# Patient Record
Sex: MALE | NOT HISPANIC OR LATINO | URBAN - METROPOLITAN AREA
[De-identification: names, ages, dates, MRNs, and addresses within clinical notes are randomized per-mention and may not be internally consistent; named-entity substitution may affect disease eponyms.]

---

## 2017-12-16 ENCOUNTER — EMERGENCY (EMERGENCY)
Facility: HOSPITAL | Age: 67
LOS: 1 days | Discharge: HOME | End: 2017-12-16
Attending: EMERGENCY MEDICINE | Admitting: EMERGENCY MEDICINE
Payer: MEDICARE

## 2017-12-16 VITALS
RESPIRATION RATE: 16 BRPM | OXYGEN SATURATION: 96 % | DIASTOLIC BLOOD PRESSURE: 70 MMHG | TEMPERATURE: 98 F | HEART RATE: 62 BPM | SYSTOLIC BLOOD PRESSURE: 116 MMHG

## 2017-12-16 VITALS
OXYGEN SATURATION: 99 % | RESPIRATION RATE: 20 BRPM | TEMPERATURE: 98 F | HEART RATE: 71 BPM | DIASTOLIC BLOOD PRESSURE: 81 MMHG | SYSTOLIC BLOOD PRESSURE: 152 MMHG

## 2017-12-16 PROCEDURE — 85730 THROMBOPLASTIN TIME PARTIAL: CPT

## 2017-12-16 PROCEDURE — 99284 EMERGENCY DEPT VISIT MOD MDM: CPT

## 2017-12-16 PROCEDURE — 84484 ASSAY OF TROPONIN QUANT: CPT

## 2017-12-16 PROCEDURE — 80048 BASIC METABOLIC PNL TOTAL CA: CPT

## 2017-12-16 PROCEDURE — 71046 X-RAY EXAM CHEST 2 VIEWS: CPT

## 2017-12-16 PROCEDURE — 85027 COMPLETE CBC AUTOMATED: CPT

## 2017-12-16 PROCEDURE — 99283 EMERGENCY DEPT VISIT LOW MDM: CPT | Mod: 25

## 2017-12-16 PROCEDURE — 85610 PROTHROMBIN TIME: CPT

## 2017-12-16 PROCEDURE — 71020: CPT | Mod: 26

## 2017-12-16 RX ORDER — METOPROLOL TARTRATE 50 MG
0 TABLET ORAL
Qty: 0 | Refills: 0 | COMMUNITY

## 2017-12-16 RX ORDER — TAMSULOSIN HYDROCHLORIDE 0.4 MG/1
1 CAPSULE ORAL
Qty: 0 | Refills: 0 | COMMUNITY

## 2017-12-16 RX ORDER — FINASTERIDE 5 MG/1
0 TABLET, FILM COATED ORAL
Qty: 0 | Refills: 0 | COMMUNITY

## 2017-12-16 RX ORDER — ASPIRIN/CALCIUM CARB/MAGNESIUM 324 MG
1 TABLET ORAL
Qty: 0 | Refills: 0 | COMMUNITY

## 2017-12-16 RX ORDER — ATORVASTATIN CALCIUM 80 MG/1
0 TABLET, FILM COATED ORAL
Qty: 0 | Refills: 0 | COMMUNITY

## 2017-12-16 RX ORDER — ATENOLOL 25 MG/1
25 TABLET ORAL ONCE
Qty: 0 | Refills: 0 | Status: COMPLETED | OUTPATIENT
Start: 2017-12-16 | End: 2017-12-16

## 2017-12-16 RX ORDER — OMEPRAZOLE 10 MG/1
0 CAPSULE, DELAYED RELEASE ORAL
Qty: 0 | Refills: 0 | COMMUNITY

## 2017-12-16 RX ADMIN — ATENOLOL 25 MILLIGRAM(S): 25 TABLET ORAL at 05:59

## 2017-12-16 NOTE — ED ADULT NURSE NOTE - OBJECTIVE STATEMENT
67 y.o male aaox3 ambulatory with h/o GERD, and "leaking heart valve" as per pt presented with c/o chest tightness non radiating and palpitations since yesterday. Denies any dizziness, lightheadedness, chills or fever, nausea, vomiting, or abd pain. VS wnl, EKG NSR. Had stress test last year from own cardiologist with results of "leaking heart valve", as per pt.

## 2017-12-16 NOTE — ED PROVIDER NOTE - OBJECTIVE STATEMENT
Attendinyo male presents with feeling of a skipped beat in the chest.  Has a choking sensation in the chest when he has the discomfort.  Had this happen about 10 years ago.  States he had an irregular heartbeat which resolved with taking atenolol.  Last night symptoms restarted again around 9pm or midnight.  States he feels uncomfortable when he gets the episodes.  States he feels it at rest.  No fever.  No cough.    PCP/Cardiology:  Dr. Mckeon

## 2017-12-16 NOTE — ED PROVIDER NOTE - PROGRESS NOTE DETAILS
Desmond:  Pt had symptoms while I was assessing him.  On monitor, he was having PACs. Desmond:  Discussed with Dr. Mckeon.  Symptoms are likely due to PACs.  Will give atenolol 25mg once.  Will have pt follow up with Dr. Mckeon as outpatient.

## 2020-09-09 ENCOUNTER — TRANSCRIPTION ENCOUNTER (OUTPATIENT)
Age: 70
End: 2020-09-09

## 2022-01-26 PROBLEM — Z00.00 ENCOUNTER FOR PREVENTIVE HEALTH EXAMINATION: Status: ACTIVE | Noted: 2022-01-26

## 2022-05-24 PROBLEM — R00.2 PALPITATIONS: Chronic | Status: ACTIVE | Noted: 2017-12-16

## 2022-06-28 ENCOUNTER — OUTPATIENT (OUTPATIENT)
Dept: OUTPATIENT SERVICES | Facility: HOSPITAL | Age: 72
LOS: 1 days | Discharge: HOME | End: 2022-06-28

## 2022-06-28 DIAGNOSIS — Z15.89 GENETIC SUSCEPTIBILITY TO OTHER DISEASE: ICD-10-CM

## 2022-07-11 ENCOUNTER — APPOINTMENT (OUTPATIENT)
Dept: HEMATOLOGY ONCOLOGY | Facility: CLINIC | Age: 72
End: 2022-07-11

## 2022-07-11 ENCOUNTER — LABORATORY RESULT (OUTPATIENT)
Age: 72
End: 2022-07-11

## 2022-07-11 NOTE — DISCUSSION/SUMMARY
[FreeTextEntry1] : \par REASON FOR CONSULT:\par Mr. Justen Meadows is a 72-year-old male referred by Dr. Mike Mckeon for cancer genetic counseling and risk assessment due to family history of breast and prostate cancer. Mr. Meadows was seen on July 11, 2022, at which time medical, personal, and family history was ascertained and a pedigree constructed. Mr. Meadows was unaccompanied. \par \par RELEVANT MEDICAL HISTORY\par Mr. Meadows reports no personal history of cancer.\par \par ADDITIONAL MEDICAL HISTORY\par •	Hypercholesterolemia\par •	Wet macular degeneration\par •	Cardiac disease\par \par SURGICAL HISTORY\par •	Cataract (Bilateral)\par •	Hemorrhoids\par •	Gallbladder\par \par CANCER SCREENING HISTORY: \par Prostate Screening:\par •	PSA: Frequency: Every 6 months: Last: 6/2022; Results: WNL\par •	Rectal Exams: Every 6 months: Last: 5/2022; Results: Neg\par \par Colon Screening:\par •	Colonoscopy: Frequency: Every 4 years; Last: 2021; Results: Reportedly, 3-4 benign polyps\par •	Alternatives (i.e. Cologuard): None\par \par Dermatological Screening: \par •	Frequency: Every 2 yrs; Last: 7/2022; Results: Lesion removed from face. Results pending.\par \par SOCIAL HISTORY\par •	Tobacco-product use: No\par •	Environmental exposures: No \par \par FAMILY HISTORY\par Maternal and paternal ancestry was reported as Chinese. Ashkenazi Alevism ancestry was denied. A detailed family history of cancer was ascertained, see below and scanned chart for pedigree.\par \par GENETIC TESTING IN FAMILY\par Mr. Meadows reported that his sister (Fany) underwent testing for cancer predisposition. Reportedly, her results were NEGATIVE. A copy of the report was unavailable at the time of the session and has been requested.\par \par Mr. Meadows reported that he is unaware of anyone in his family, other than his sister, Fany, receiving genetic testing for cancer susceptibility.   \par 	\par RISK ASSESSMENT\par Mr. Meadows’s family history is suggestive of a hereditary cancer syndrome given his sister’s early onset breast cancer and his two brothers’ diagnosis of prostate cancer. The patient meets National Comprehensive Cancer Network (NCCN) criteria for genetic testing. Given that recent advances in genomic technology allow for simultaneous analysis of multiple genes, we discussed the option of testing for a panel of genes currently known to be associated with hereditary cancer.\par \par Mr. Meadows was informed that should his results reveal a pathogenic variant, increased cancer screening and risk-reducing options would be discussed.\par \par It was emphasized to Mr. Meadows that his genetic risk assessment was based upon personal and biological relatives’ medical history as provided. We discussed the importance of cancer pathology and genetic test reports in the assessment of cancer syndromes and requested all relevant reports. Current risk assessment may change in the future should new information be obtained. Therefore, it was recommended that he contact us if any additional information becomes available.  \par \par GENETIC TESTING\par Given Mr. Meadows’s personal and family history of cancer, testing for a panel of genes associated with increased risk for breast and prostate cancer was recommended. This test analyzes the CARLOS, BARD1, BRCA1, BRCA2, BRIP1, CDH1, CHEK2, PALB2, PTEN, RAD51C, RAD51D, STK11, TP53, EPCAM, HOXB13, MLH1, MSH2, MSH6, NBN, and PMS2 genes.\par \par The benefits, risks and limitations of genetic testing were discussed with Mr. Meadows. In addition, we discussed the purpose of genetic testing and possible test results (positive, negative, inconclusive) along with associated medical management options. We reviewed genetic implications for family members along with psychosocial factors associated with genetic testing. Insurance coverage and potential out of pocket costs were also discussed. Mr. Meadows was informed about the Genetic Information Non-discrimination Act (KAM) and the potential for genetic discrimination, and he was provided with written information regarding KAM and with a brochure from [employer].\par \par Following the discussion, Mr. Meadows consented to pursue testing. He was taken to our laboratory for a blood draw. His sample was sent today to [facility]. \par \par PLAN:  \par We will contact Mr. Meadows to schedule a follow-up appointment when results are available. Results are expected in 2-3 weeks.  Dr. Mike Mckeon will also receive a copy of the test results and follow-up note from our service.  Mr. Meadows stated that he understands and agrees with this plan. \par \par Mr. Meadows signed a medical release form to allow discussion of his genetics information with his wife and children. This will be scanned into his chart.\par \par Mr. Meadows informed that consult notes will be available through his [facility]’s EMR and genetic test results will be released through [facility]’s portal.\par \par We remain available to Mr. Meadows for any questions, comments, or concerns.\par \par Sherly Dennis MS, [facility]\par Genetic Counselor, cancer Genetics\par \par Attachment: Pedigree\par \par CC/att: Mike Mckeon M.D.\par

## 2022-07-22 ENCOUNTER — NON-APPOINTMENT (OUTPATIENT)
Age: 72
End: 2022-07-22

## 2022-07-22 NOTE — DISCUSSION/SUMMARY
[FreeTextEntry1] : \par REASON FOR CONSULT\par Mr. Justen Meadows is a 72-year-old male referred by Dr. Mike Mckeon for cancer genetic counseling and risk assessment due to family history of breast and prostate cancer. Mr. Meadows was seen on July 11, 2022, at which time a personal and family history of cancer was obtained, and germline genetic testing was ordered. On July 22, 2022, the test results were discussed with Mr. Meadows over the phone.      \par \par TEST RESULTS\par Mr. Meadows underwent germline testing for mutations in the CARLOS, BARD1, BRCA1, BRCA2, BRIP1, CDH1, CHEK2, EPCAM, HOXB13, MLH1, MSH2, MSH6, NBN, PALB2, PMS2, PTEN, RAD51C, RAD51D, STK11, TP53 genes through [facility]. \par \par His results revealed: NEGATIVE\par •	No variants of clinical significance were reported in any of the genes analyzed\par \par ASSESSMENT AND PLAN\par Mr. Meadows tested negative for pathogenic mutations in known high- and moderate-penetrance cancer susceptibility genes. At this time, the cause of Mr. Meadows’s family history of cancer remains unknown. The cancers may have developed randomly, or due to environmental factors. We discussed that although this genetic test results are reassuring; they do not entirely rule out the possibility of hereditary cancer risk. \par \par We reviewed the limitations of negative results: \par 1.	Cancers in the family may be associated with a mutation in the genes tested but Mr. Meadows did not inherit it\par 2.	Cancers in his family may be associated with another cancer susceptibility gene not yet tested or identified \par 3.	Cancers in his family may be associated with an uncommon abnormality in the genes tested that could not be detected due to limitations in technology\par 4.	Cancers in his family may not be of a hereditary type, referred to as sporadic\par \par Given the negative genetic test results and Mr. Meadows’s personal medical history and current reported family history of cancer, it was recommended that, in the absence of other indications, Mr. Meadows practice age-appropriate cancer screening as recommended for the general population.\par \par IMPLICATIONS FOR FAMILY MEMBERS\par Mr. Meadows’s current test results do not exclude the possibility of a germline mutation in his at-risk relatives. Thus, individualized genetic counseling by a healthcare professional trained in cancer genetics and consideration of testing for his siblings is recommended.  \par \par Due to the potential implications for Mr. Meadows’s relatives’ medical care, we emphasized the importance of sharing this information with his relatives as noted above. We would be happy to discuss this information with his relatives or refer them to cancer genetics centers in their areas.\par \par To locate a genetic counselor, relatives may refer to the National Society of Genetic Counselors (www.nsgc.org/findageneticcounselor) or to the National Cancer [place] [facility] Services Directory (www.cancer.gov/cancertopics/genetics/directory) and enter the city, state and country in the "location" section of the search tool. \par \par SUMMARY & PLAN\par See above note for cancer surveillance and management recommendations. \par \par The importance of re-contacting us with updates regarding his personal and/or family history of cancer, as well as any updates regarding additional cancer genetic test results performed on him and/or family members, was emphasized. Such updates could possibly change our risk assessment and recommendations. \par \par It was recommended that Mr. Meadows contact us every 2-3 years to inquire about relevant advances in cancer genetics.  \par \par Mr. Meadows was also encouraged to share the information discussed with family members as noted in this letter. Due to HIPAA and [place] laws, we are unable to directly contact at risk family members, but we are available should family members wish to reach out to us. \par \par Test results were released to Mr. Meadows via [employer]’s portal on July 22, 2022. In addition, he is aware that his consult notes are available through the [facility]’s EMR portal.\par \par We remain available to Mr. Meadows for future questions, comments, or concerns.      \par \par Sherly Dennis, MS, [facility]\par Genetic Counselor, Cancer Genetics\par \par Attachment: Genetic Test Results  \par \par Cc/Att: Mike Mckeon M.D. \par              Mr. Justen Meadows

## 2022-12-21 ENCOUNTER — APPOINTMENT (OUTPATIENT)
Dept: ORTHOPEDIC SURGERY | Facility: CLINIC | Age: 72
End: 2022-12-21

## 2022-12-21 VITALS — WEIGHT: 150 LBS | HEIGHT: 67 IN | BODY MASS INDEX: 23.54 KG/M2

## 2022-12-21 DIAGNOSIS — M25.571 PAIN IN RIGHT ANKLE AND JOINTS OF RIGHT FOOT: ICD-10-CM

## 2022-12-21 PROCEDURE — 73630 X-RAY EXAM OF FOOT: CPT | Mod: RT

## 2022-12-21 PROCEDURE — L1902: CPT | Mod: KX,RT

## 2022-12-21 PROCEDURE — 99214 OFFICE O/P EST MOD 30 MIN: CPT

## 2022-12-21 PROCEDURE — 73600 X-RAY EXAM OF ANKLE: CPT | Mod: RT

## 2022-12-21 RX ORDER — MELOXICAM 15 MG/1
15 TABLET ORAL
Qty: 30 | Refills: 1 | Status: ACTIVE | COMMUNITY
Start: 2022-12-21 | End: 1900-01-01

## 2022-12-21 NOTE — IMAGING
[Right] : right foot [Weight -] : weightbearing [FreeTextEntry9] : pes planus [de-identified] : pes planus

## 2022-12-21 NOTE — PHYSICAL EXAM
[Right] : right foot and ankle [Mild] : mild swelling of lateral foot [NL (40)] : plantar flexion 40 degrees [NL 30)] : inversion 30 degrees [NL (20)] : eversion 20 degrees [5___] : eversion 5[unfilled]/5 [2+] : dorsalis pedis pulse: 2+ [] : ambulation with cane
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
## 2022-12-21 NOTE — HISTORY OF PRESENT ILLNESS
[7] : 7 [2] : 2 [de-identified] : 12/21/2022:  1 week of right  foot and ankle pain without known mechanism.  no specific injury. no tx to date. no prior foot/ankle probs. denies n/t. denies h/o gout. +dm a1c 6.2. no tob [FreeTextEntry1] : right ankle//foot

## 2023-02-01 ENCOUNTER — APPOINTMENT (OUTPATIENT)
Dept: ORTHOPEDIC SURGERY | Facility: CLINIC | Age: 73
End: 2023-02-01
